# Patient Record
Sex: FEMALE | Race: BLACK OR AFRICAN AMERICAN
[De-identification: names, ages, dates, MRNs, and addresses within clinical notes are randomized per-mention and may not be internally consistent; named-entity substitution may affect disease eponyms.]

---

## 2019-08-04 ENCOUNTER — HOSPITAL ENCOUNTER (EMERGENCY)
Dept: HOSPITAL 97 - ER | Age: 19
Discharge: HOME | End: 2019-08-04
Payer: SELF-PAY

## 2019-08-04 DIAGNOSIS — M54.5: Primary | ICD-10-CM

## 2019-08-04 LAB
ALBUMIN SERPL BCP-MCNC: 4.2 G/DL (ref 3.4–5)
ALP SERPL-CCNC: 68 U/L (ref 45–117)
ALT SERPL W P-5'-P-CCNC: 24 U/L (ref 12–78)
AST SERPL W P-5'-P-CCNC: 14 U/L (ref 15–37)
BUN BLD-MCNC: 9 MG/DL (ref 7–18)
GLUCOSE SERPLBLD-MCNC: 104 MG/DL (ref 74–106)
HCT VFR BLD CALC: 40.4 % (ref 36–45)
LIPASE SERPL-CCNC: 241 U/L (ref 73–393)
LYMPHOCYTES # SPEC AUTO: 1.9 K/UL (ref 0.7–4.9)
PMV BLD: 11 FL (ref 7.6–11.3)
POTASSIUM SERPL-SCNC: 4.1 MMOL/L (ref 3.5–5.1)
RBC # BLD: 4.58 M/UL (ref 3.86–4.86)

## 2019-08-04 PROCEDURE — 80076 HEPATIC FUNCTION PANEL: CPT

## 2019-08-04 PROCEDURE — 81025 URINE PREGNANCY TEST: CPT

## 2019-08-04 PROCEDURE — 99284 EMERGENCY DEPT VISIT MOD MDM: CPT

## 2019-08-04 PROCEDURE — 36415 COLL VENOUS BLD VENIPUNCTURE: CPT

## 2019-08-04 PROCEDURE — 83690 ASSAY OF LIPASE: CPT

## 2019-08-04 PROCEDURE — 80048 BASIC METABOLIC PNL TOTAL CA: CPT

## 2019-08-04 PROCEDURE — 85025 COMPLETE CBC W/AUTO DIFF WBC: CPT

## 2019-08-04 PROCEDURE — 76705 ECHO EXAM OF ABDOMEN: CPT

## 2019-08-04 PROCEDURE — 81003 URINALYSIS AUTO W/O SCOPE: CPT

## 2019-08-04 NOTE — EDPHYS
Physician Documentation                                                                           

 [facility]                                                                 

Name: Julissa Cleveland                                                                                

Age: 19 yrs                                                                                       

Sex: Female                                                                                       

: 2000                                                                                   

MRN: [id number]                                                                                   

Arrival Date: 2019                                                                          

Time: 17:49                                                                                       

Account#: [id number]                                                                            

Bed 23                                                                                            

Private MD:                                                                                       

ED Physician Wilfrid Monteiro                                                                         

HPI:                                                                                              

                                                                                             

18:28 This 22 yrs old Black Female presents to ER via Ambulatory with complaints of Back Pain.cp  

18:28 The patient presents with pain that is acute, with no known mechanism of injury. The    cp  

      symptoms are located in the mid back area. Onset: The symptoms/episode began/occurred       

      last 2-3 days. The pain does not radiate. Associated signs and symptoms: Pertinent          

      positives: Pertinent negatives: chest pain, constipation, dysuria, fever, hematuria,        

      incontinence, numbness, tingling, urinary retention, weakness. The problem was              

      sustained from unknown cause. Modifying factors: the patient symptoms are aggravated by     

      standing. Severity of symptoms: in the emergency department the symptoms are unchanged,     

      despite home interventions.                                                                 

18:28 The patient has not experienced similar symptoms in the past.                           cp  

                                                                                                  

OB/GYN:                                                                                           

17:52 LMP 2019                                                                             

                                                                                                  

Historical:                                                                                       

- Allergies:                                                                                      

17:51 No Known Allergies;                                                                     hj  

- PMHx:                                                                                           

17:51 None;                                                                                   hj  

- PSHx:                                                                                           

17:51 None;                                                                                   hj  

                                                                                                  

- Immunization history:: Adult Immunizations up to date.                                          

- Social history:: Smoking status: unknown.                                                       

- Ebola Screening: : No symptoms or risks identified at this time.                                

                                                                                                  

                                                                                                  

ROS:                                                                                              

18:35 Constitutional: Negative for body aches, chills, fever, poor PO intake.                 cp  

18:35 Eyes: Negative for injury, pain, redness, and discharge.                                cp  

18:35 ENT: Negative for drainage from ear(s), ear pain, sore throat, difficulty swallowing,       

      difficulty handling secretions.                                                             

18:35 Cardiovascular: Negative for chest pain, edema, palpitations.                               

18:35 Respiratory: Negative for cough, shortness of breath, wheezing.                             

18:35 Abdomen/GI: Positive for abdominal pain, Negative for vomiting, diarrhea, constipation,     

      anorexia, black/tarry stool, rectal bleeding, bowel incontinence.                           

18:35 Back: Positive for pain at rest, pain with movement, of the mid back area.                  

18:35 : Negative for urinary symptoms, pelvic pain, difficulty urinating, bladder               

      incontinence, vaginal discharge.                                                            

18:35 Skin: Negative for rash.                                                                    

18:35 Neuro: Negative for altered mental status, dizziness, headache, numbness, tingling,         

      weakness.                                                                                   

18:35 All other systems are negative.                                                             

                                                                                                  

Exam:                                                                                             

18:45 Constitutional: The patient appears in no acute distress, alert, awake, non-toxic, well cp  

      developed, well nourished.                                                                  

18:45 Head/Face:  Normocephalic, atraumatic.                                                  cp  

18:45 Eyes: Periorbital structures: appear normal, Conjunctiva: normal, no exudate, no            

      injection, Sclera: no appreciated abnormality, Lids and lashes: appear normal,              

      bilaterally.                                                                                

18:45 ENT: External ear(s): are unremarkable, Nose: is normal, Mouth: Lips: moist, Oral           

      mucosa: pink and intact, moist, Posterior pharynx: is normal, airway is patent, no          

      erythema, no exudate.                                                                       

18:45 Chest/axilla: Inspection: normal, Palpation: is normal, no crepitus, no tenderness.         

18:45 Cardiovascular: Rate: normal, Rhythm: regular.                                              

18:45 Respiratory: the patient does not display signs of respiratory distress,  Respirations:     

      normal, no use of accessory muscles, no retractions, no splinting, no tachypnea,            

      labored breathing, is not present, Breath sounds: are clear throughout, no decreased        

      breath sounds, no stridor, no wheezing.                                                     

18:45 Abdomen/GI: Inspection: abdomen appears normal, Bowel sounds: active, all quadrants,        

      Palpation: soft, in all quadrants, mild abdominal tenderness, in the right upper            

      quadrant, rebound tenderness, is not appreciated, voluntary guarding, is not                

      appreciated, involuntary guarding, is not appreciated.                                      

18:45 Back: pain, that is mild, of the  mid back area, ROM is normal.                             

18:45 Neuro: Orientation: to person, place \T\ time. Mentation: is normal, Motor: moves all       

      fours, strength is normal, Sensation: is normal, Gait: is steady, Deep tendon reflexes      

      are 2+ (normal) in the  right patellar, right Achilles, left patellar and left Achilles.    

                                                                                                  

Vital Signs:                                                                                      

17:51  / 65; Pulse 63; Resp 18; Temp 99.0(O); Pulse Ox 99% on R/A; Weight 87.09 kg;     hj  

      Height 5 ft. 5 in. (165.10 cm); Pain 7/10;                                                  

19:29  / 77; Pulse 51; Resp 15; Pulse Ox 100% ;                                         rv  

20:27  / 79; Pulse 61; Resp 16; Temp 98.5; Pulse Ox 99% ;                               rv  

17:51 Body Mass Index 31.95 (87.09 kg, 165.10 cm)                                             hj  

                                                                                                  

MDM:                                                                                              

18:14 Patient medically screened.                                                             cp  

20:20 Data reviewed: vital signs, nurses notes, lab test result(s), radiologic studies,       cp  

      ultrasound.                                                                                 

20:20 Differential diagnosis: Cholelithiasis Pregnancy Pyelonephritis Ureterolithiasis UTI,   cp  

      muscle strain. Counseling: I had a detailed discussion with the patient and/or guardian     

      regarding: the historical points, exam findings, and any diagnostic results supporting      

      the discharge/admit diagnosis, lab results, radiology results, to return to the             

      emergency department if symptoms worsen or persist or if there are any questions or         

      concerns that arise at home. Response to treatment: the patient's symptoms have mildly      

      improved after treatment, and as a result, I will discharge patient.                        

20:20 ED course: VSS. Labs and US negative for acute findings. Will treat for musculoskeletal cp  

      pain and discharge to home for continued monitoring.                                        

                                                                                                  

                                                                                             

18:29 Order name: Basic Metabolic Panel; Complete Time: 20:00                                 cp  

04                                                                                             

20:00 Interpretation: Normal except: GFR 71.                                                    

                                                                                             

18:29 Order name: CBC with Diff; Complete Time: 19:31                                         cp  

                                                                                             

18:29 Order name: Creatinine for Radiology; Complete Time: 20:00                              cp  

                                                                                             

18:29 Order name: Hepatic Function; Complete Time: 20:00                                      cp  

                                                                                             

18:29 Order name: Lipase; Complete Time: 20:00                                                cp  

04                                                                                             

19:09 Order name: Urine Dipstick--Ancillary (enter results); Complete Time: 19:31             ar5 

                                                                                             

18:29 Order name: IV Saline Lock; Complete Time: 18:55                                        cp  

                                                                                             

18:29 Order name: Labs collected and sent; Complete Time: 18:55                               cp  

                                                                                             

18:29 Order name: US Abdomen Limited: RUQ                                                     cp  

                                                                                             

18:29 Order name: Urine Dipstick-Ancillary (obtain specimen); Complete Time: 18:55            cp  

                                                                                             

18:29 Order name: Urine Pregnancy Test (obtain specimen); Complete Time: 18:55                cp  

                                                                                             

19:09 Order name: Urine Pregnancy--Ancillary (enter results); Complete Time: 19:31            ar5 

                                                                                                  

Administered Medications:                                                                         

No medications were administered                                                                  

                                                                                                  

                                                                                                  

Disposition:                                                                                      

19 20:20 Discharged to Home. Impression: Low back pain.                                     

- Condition is Stable.                                                                            

- Discharge Instructions: Back Pain, Adult, Back Exercises, Easy-to-Read.                         

- Prescriptions for Ibuprofen 800 mg Oral Tablet - take 1 tablet by ORAL route every 8            

  hours As needed take with food; 30 tablet. Cyclobenzaprine 10 mg Oral Tablet - take 1           

  tablet by ORAL route every 8 hours As needed no driving while taking medication; 15             

  tablet.                                                                                         

- Medication Reconciliation Form, Thank You Letter, Antibiotic Education, Prescription            

  Opioid Use form.                                                                                

- Follow up: Private Physician; When: 2 - 3 days; Reason: Recheck today's complaints.             

- Problem is new.                                                                                 

- Symptoms have improved.                                                                         

                                                                                                  

                                                                                                  

                                                                                                  

Addendum:                                                                                         

2019                                                                                        

     19:53 Co-signature as Attending Physician, Wilfrid Monteiro MD.                                    r
n

                                                                                                  

Signatures:                                                                                       

Dispatcher [employer]                           EDMS                                                 

Wilfrid Monteiro MD MD rn Joaquin, Henry RN                      RN   Heber Delacruz PA                         PA   cp                                                   

Elias Wilson, RN                    RN   rv                                                   

                                                                                                  

Corrections: (The following items were deleted from the chart)                                    

                                                                                             

20:28 20:20 2019 20:20 Discharged to Home. Impression: Low back pain. Condition is      rv  

      Stable. Forms are Medication Reconciliation Form, Thank You Letter, Antibiotic              

      Education, Prescription Opioid Use. Follow up: Private Physician; When: 2 - 3 days;         

      Reason: Recheck today's complaints. Problem is new. Symptoms have improved. cp              

                                                                                                  

**************************************************************************************************

## 2019-08-04 NOTE — RAD REPORT
EXAM DESCRIPTION:  US - Abdomen Exam Limited - 8/4/2019 7:51 pm

 

CLINICAL HISTORY:  Abdominal pain

 

Preliminary findings provided at the time of the study.

 

COMPARISON:  None.

 

FINDINGS:  No gallstones, sludge or other abnormalities within the gallbladder lumen. There is no wal
l thickening or pericholecystic fluid.

 

No common duct stone or biliary tree dilatation identified.

 

IMPRESSION:  Normal gallbladder and biliary tree ultrasound.

## 2019-08-04 NOTE — ER
Nurse's Notes                                                                                     

 [facility]                                                                 

Name: Julissa Cleveland                                                                                

Age: 19 yrs                                                                                       

Sex: Female                                                                                       

: 2000                                                                                   

MRN: [id number]                                                                                   

Arrival Date: 2019                                                                          

Time: 17:49                                                                                       

Account#: [id number]                                                                            

Bed 23                                                                                            

Private MD:                                                                                       

Diagnosis: Low back pain                                                                          

                                                                                                  

Presentation:                                                                                     

                                                                                             

17:49 Presenting complaint: Patient states: my lower back has been hurting for 2-3 days now,  hj  

      denies trauma to the area; LMP- 19;. Transition of care: patient was not received      

      from another setting of care. Onset of symptoms was 2019. Risk Assessment:       

      Do you want to hurt yourself or someone else? Patient reports no desire to harm self or     

      others. Initial Sepsis Screen: Does the patient meet any 2 criteria? No. Patient's          

      initial sepsis screen is negative. Does the patient have a suspected source of              

      infection? No. Patient's initial sepsis screen is negative. Care prior to arrival: None.    

17:49 Method Of Arrival: Ambulatory                                                             

17:49 Acuity: DWIGHT 4                                                                             

                                                                                                  

OB/GYN:                                                                                           

17:52 [facility] 2019                                                                             

                                                                                                  

Historical:                                                                                       

- Allergies:                                                                                      

17:51 No Known Allergies;                                                                       

- PMHx:                                                                                           

17:51 None;                                                                                     

- PSHx:                                                                                           

17:51 None;                                                                                     

                                                                                                  

- Immunization history:: Adult Immunizations up to date.                                          

- Social history:: Smoking status: unknown.                                                       

- Ebola Screening: : No symptoms or risks identified at this time.                                

                                                                                                  

                                                                                                  

Screenin:00 Abuse screen: Denies threats or abuse. Denies injuries from another. Nutritional        rv  

      screening: No deficits noted. Tuberculosis screening: No symptoms or risk factors           

      identified. Fall Risk None identified.                                                      

                                                                                                  

Assessment:                                                                                       

18:57 General: Appears in no apparent distress. comfortable, Behavior is calm, cooperative.   rv  

      Pain: Complains of pain in back. Neuro: Level of Consciousness is awake, alert, obeys       

      commands, Oriented to person, place, time, situation. Cardiovascular: Patient's skin is     

      warm and dry. Respiratory: Airway is patent. GI: No signs and/or symptoms were reported     

      involving the gastrointestinal system. : No signs and/or symptoms were reported           

      regarding the genitourinary system. EENT: No signs and/or symptoms were reported            

      regarding the EENT system. Derm: Skin is intact. Musculoskeletal: Reports pain in back.     

                                                                                                  

Vital Signs:                                                                                      

17:51  / 65; Pulse 63; Resp 18; Temp 99.0(O); Pulse Ox 99% on R/A; Weight 87.09 kg;     hj  

      Height 5 ft. 5 in. (165.10 cm); Pain 7/10;                                                  

19:29  / 77; Pulse 51; Resp 15; Pulse Ox 100% ;                                         rv  

20:27  / 79; Pulse 61; Resp 16; Temp 98.5; Pulse Ox 99% ;                               rv  

17:51 Body Mass Index 31.95 (87.09 kg, 165.10 cm)                                               

                                                                                                  

ED Course:                                                                                        

17:49 Patient arrived in ED.                                                                  hj  

17:51 Triage completed.                                                                       hj  

17:51 Arm band placed on right wrist.                                                         hj  

18:13 Heber Chan PA is PHCP.                                                                cp  

18:13 Wilfrid Monteiro MD is Attending Physician.                                                cp  

18:55 Elias Wilson RN is Primary Nurse.                                                  rv  

18:56 Inserted saline lock: 20 gauge in right antecubital area, using aseptic technique.      rv  

      Blood collected.                                                                            

19:00 Patient has correct armband on for positive identification. Bed in low position. Call   rv  

      light in reach. Side rails up X 1. Pulse ox on. NIBP on.                                    

19:05 Ultrasound completed. Patient tolerated well. Notified NP/PA kathleen.                      sg3 

19:50 US Abdomen Limited: RUQ In Process Unspecified.                                         EDMS

20:27 No provider procedures requiring assistance completed. IV discontinued, intact,         rv  

      bleeding controlled, No redness/swelling at site. Pressure dressing applied.                

                                                                                                  

Administered Medications:                                                                         

No medications were administered                                                                  

                                                                                                  

                                                                                                  

Outcome:                                                                                          

20:20 Discharge ordered by MD.                                                                cp  

20:28 Discharged to home ambulatory.                                                          rv  

20:28 Condition: good                                                                             

20:28 Discharge instructions given to patient, Instructed on discharge instructions, follow       

      up and referral plans. medication usage, Demonstrated understanding of instructions,        

      follow-up care, medications, Prescriptions given X 2.                                       

20:28 Patient left the ED.                                                                    rv  

                                                                                                  

Signatures:                                                                                       

Dispatcher [employer]                           EDMS                                                 

Stewart Mesa RN RN                                                      

Heber Chan PA PA cp Godinez, Sarah                               sg3                                                  

Elias Wilson RN                    RN   rv                                                   

                                                                                                  

Corrections: (The following items were deleted from the chart)                                    

17:53 17:51 Pulse 63bpm; Resp 18bpm; Pulse Ox 99% RA; Temp 99.0F Oral; 87.09 kg; Height 5 ft. hj  

      5 in.; BMI: 31.9; Pain 7/10; hj                                                             

                                                                                                  

**************************************************************************************************